# Patient Record
Sex: FEMALE
[De-identification: names, ages, dates, MRNs, and addresses within clinical notes are randomized per-mention and may not be internally consistent; named-entity substitution may affect disease eponyms.]

---

## 2021-05-03 ENCOUNTER — HOSPITAL ENCOUNTER (EMERGENCY)
Dept: HOSPITAL 46 - ED | Age: 26
Discharge: HOME | End: 2021-05-03
Payer: SELF-PAY

## 2021-05-03 VITALS — BODY MASS INDEX: 28.47 KG/M2 | WEIGHT: 145 LBS | HEIGHT: 60 IN

## 2021-05-03 DIAGNOSIS — T50.B95A: ICD-10-CM

## 2021-05-03 DIAGNOSIS — T88.1XXA: Primary | ICD-10-CM

## 2021-05-03 DIAGNOSIS — Z88.2: ICD-10-CM

## 2021-05-03 DIAGNOSIS — R06.02: ICD-10-CM

## 2021-05-04 ENCOUNTER — HOSPITAL ENCOUNTER (EMERGENCY)
Dept: HOSPITAL 46 - ED | Age: 26
Discharge: HOME | End: 2021-05-04
Payer: SELF-PAY

## 2021-05-04 VITALS — HEIGHT: 60 IN | BODY MASS INDEX: 28.47 KG/M2 | WEIGHT: 145 LBS

## 2021-05-04 DIAGNOSIS — T50.B95A: ICD-10-CM

## 2021-05-04 DIAGNOSIS — Z79.899: ICD-10-CM

## 2021-05-04 DIAGNOSIS — T88.1XXA: Primary | ICD-10-CM

## 2021-05-04 DIAGNOSIS — F41.9: ICD-10-CM

## 2021-05-10 ENCOUNTER — HOSPITAL ENCOUNTER (EMERGENCY)
Dept: HOSPITAL 46 - ED | Age: 26
Discharge: HOME | End: 2021-05-10
Payer: SELF-PAY

## 2021-05-10 VITALS — WEIGHT: 145 LBS | HEIGHT: 60 IN | BODY MASS INDEX: 28.47 KG/M2

## 2021-05-10 DIAGNOSIS — Z88.7: ICD-10-CM

## 2021-05-10 DIAGNOSIS — F41.1: Primary | ICD-10-CM

## 2021-05-10 DIAGNOSIS — Z79.899: ICD-10-CM

## 2021-05-10 DIAGNOSIS — Z88.2: ICD-10-CM

## 2021-05-10 NOTE — XMS
PreManage Notification: DEREK SANCHEZ MRN:[id number]
 
Security Information
 
Security Events
No recent Security Events currently on file
 
 
 
CRITERIA MET
------------
- [facility] - 2 Visits in 30 Days
 
 
CARE PROVIDERS
There are no care providers on record at this time.
 
Ramin has no Care Guidelines for this patient.
 
RAJAT VISIT COUNT (12 MO.)
-------------------------------------------------------------------------------------
3  [facility]
-------------------------------------------------------------------------------------
TOTAL 3
-------------------------------------------------------------------------------------
NOTE: Visits indicate total known visits.
 
ED/C VISIT TRACKING (12 MO.)
-------------------------------------------------------------------------------------
05/10/2021 10:02
 [facility]     [place]
 
TYPE: Emergency
 
COMPLAINT:
- CHEST PREASURE, ANXIETY
-------------------------------------------------------------------------------------
05/04/2021 10:21
[facility]
 
TYPE: Emergency
 
COMPLAINT:
- MEDICATION REACTION
 
DIAGNOSES:
- Other long term (current) drug therapy
- Adverse effect of other viral vaccines, initial encounter
- Anxiety disorder, unspecified
- Paresthesia of skin
- Other complications following immunization, not elsewhere classified, initial
encounter
-------------------------------------------------------------------------------------
05/03/2021 16:46
[facility] [place]
 
TYPE: Emergency
 
COMPLAINT:
 
- FACIAL NUMBNESS
 
DIAGNOSES:
- Adverse effect of other viral vaccines, initial encounter
- Other complications following immunization, not elsewhere classified, initial
encounter
- Shortness of breath
- Allergy status to sulfonamides
-------------------------------------------------------------------------------------
 
 
INPATIENT VISIT TRACKING (12 MO.)
No inpatient visits to display in this time frame
 
[email or web address]

## 2021-05-12 NOTE — EKG
[facility]
                                    [address]
                                  [place], [place]  [address]
_________________________________________________________________________________________
                                                                 Signed   
 
 
Normal sinus rhythm
Normal ECG
No previous ECGs available
Confirmed by MARTY IRVING DO ([id number]) on 5/12/2021 1:48:07 PM
 
 
 
 
 
 
 
 
 
 
 
 
 
 
 
 
 
 
 
 
 
 
 
 
 
 
 
 
 
 
 
 
 
 
 
 
 
 
 
 
 
    Electronically Signed By: MARTY IRVING DO  05/12/21 1348
_________________________________________________________________________________________
PATIENT NAME:     DEREK SANCHEZ                        
MEDICAL RECORD #: [id number]                     Electrocardiogram             
          ACCT #: [id number]  
DATE OF BIRTH:   [birth year]                                       
PHYSICIAN:   MARTY IRVING DO                     REPORT #: [id number]
REPORT IS CONFIDENTIAL AND NOT TO BE RELEASED WITHOUT AUTHORIZATION

## 2021-05-13 ENCOUNTER — HOSPITAL ENCOUNTER (EMERGENCY)
Dept: HOSPITAL 46 - ED | Age: 26
Discharge: HOME | End: 2021-05-13
Payer: SELF-PAY

## 2021-05-13 VITALS — WEIGHT: 142.99 LBS | BODY MASS INDEX: 28.07 KG/M2 | HEIGHT: 60 IN

## 2021-05-13 DIAGNOSIS — F41.9: Primary | ICD-10-CM

## 2021-05-13 DIAGNOSIS — Z79.899: ICD-10-CM

## 2021-05-13 DIAGNOSIS — Z88.2: ICD-10-CM

## 2021-05-13 NOTE — XMS
PreManage Notification: DEREK SANCHEZ MRN:[id number]
 
Security Information
 
Security Events
No recent Security Events currently on file
 
 
 
CRITERIA MET
------------
- [facility] - 2 Visits in 30 Days
 
 
CARE PROVIDERS
-------------------------------------------------------------------------------------
UMA FUNES      Physician Assistant     05/11/2021-Current
 
PHONE: [phone]
-------------------------------------------------------------------------------------
 
Ramin has no Care Guidelines for this patient.
Care History
Medical/Surgical
05/11/2021    [facility]
 
      - PATIENT [facility] OFF[facility] 
      STATED.
      - PATIENT HAS A FOLLOW UP APT WITH UMA FUNES- 05/13/21 @ 9:20AM 
      - [facility] CALLED PATIENT-WENT STRAIGHT TO VOICEMAIL.
RAJAT VISIT COUNT (12 MO.)
-------------------------------------------------------------------------------------
4 [facility]
-------------------------------------------------------------------------------------
TOTAL 4
-------------------------------------------------------------------------------------
NOTE: Visits indicate total known visits.
 
ED/UCC VISIT TRACKING (12 MO.)
-------------------------------------------------------------------------------------
05/13/2021 22:16
[facility]
 
TYPE: Emergency
 
COMPLAINT:
- DIFFICULTY BREATHING
-------------------------------------------------------------------------------------
05/10/2021 10:02
[facility] [place]
 
TYPE: Emergency
 
COMPLAINT:
- CHEST PREASURE, ANXIETY
 
DIAGNOSES:
- Allergy status to serum and vaccine
- Other chest pain
 
- Other long term (current) drug therapy
- Generalized anxiety disorder
- Allergy status to sulfonamides
-------------------------------------------------------------------------------------
05/04/2021 10:21
[facility]
 
TYPE: Emergency
 
COMPLAINT:
- MEDICATION REACTION
 
DIAGNOSES:
- Other long term (current) drug therapy
- Adverse effect of other viral vaccines, initial encounter
- Anxiety disorder, unspecified
- Paresthesia of skin
- Other complications following immunization, not elsewhere classified, initial
encounter
-------------------------------------------------------------------------------------
05/03/2021 16:46
[facility] [place]
 
TYPE: Emergency
 
COMPLAINT:
- FACIAL NUMBNESS
 
DIAGNOSES:
- Adverse effect of other viral vaccines, initial encounter
- Other complications following immunization, not elsewhere classified, initial
encounter
- Shortness of breath
- Allergy status to sulfonamides
-------------------------------------------------------------------------------------
 
 
INPATIENT VISIT TRACKING (12 MO.)
No inpatient visits to display in this time frame
 
[email or web address]

## 2021-05-17 ENCOUNTER — HOSPITAL ENCOUNTER (EMERGENCY)
Dept: HOSPITAL 46 - ED | Age: 26
LOS: 1 days | Discharge: HOME | End: 2021-05-18
Payer: SELF-PAY

## 2021-05-17 VITALS — WEIGHT: 147.71 LBS | HEIGHT: 60 IN | BODY MASS INDEX: 29 KG/M2

## 2021-05-17 DIAGNOSIS — R20.0: Primary | ICD-10-CM

## 2021-05-17 DIAGNOSIS — R20.2: ICD-10-CM

## 2021-05-17 DIAGNOSIS — Z88.2: ICD-10-CM

## 2021-05-17 NOTE — XMS
PreManage Notification: DEREK SANCHEZ MRN:[id number]
 
Security Information
 
Security Events
No recent Security Events currently on file
 
 
 
CRITERIA MET
------------
- [facility] - 2 Visits in 30 Days
 
 
CARE PROVIDERS
-------------------------------------------------------------------------------------
UMA FUNES      Physician Assistant     05/11/2021-Current
 
PHONE: [phone]
-------------------------------------------------------------------------------------
 
Ramin has no Care Guidelines for this patient.
Care History
Medical/Surgical
05/11/2021    [facility]
 
      - PATIENT [facility] 
      STATED.
      - PATIENT HAS A FOLLOW UP APT WITH UMA FUNES- 05/13/21 @ 9:20AM 
      - [facility] CALLED PATIENT-WENT STRAIGHT TO VOICEMAIL.
RAAJT VISIT COUNT (12 MO.)
-------------------------------------------------------------------------------------
5 [facility]
-------------------------------------------------------------------------------------
TOTAL 5
-------------------------------------------------------------------------------------
NOTE: Visits indicate total known visits.
 
ED/UCC VISIT TRACKING (12 MO.)
-------------------------------------------------------------------------------------
05/17/2021 22:35
[facility]
 
TYPE: Emergency
 
COMPLAINT:
- LT SIDE FACE AND ARM NUMBNESS
-------------------------------------------------------------------------------------
05/13/2021 22:16
[facility]
 
TYPE: Emergency
 
COMPLAINT:
- DIFFICULTY BREATHING
 
DIAGNOSES:
- Allergy status to sulfonamides
- Other long term (current) drug therapy
 
- Anxiety disorder, unspecified
-------------------------------------------------------------------------------------
05/10/2021 10:02
[facility]
 
TYPE: Emergency
 
COMPLAINT:
- CHEST PREASURE, ANXIETY
 
DIAGNOSES:
- Allergy status to serum and vaccine
- Other chest pain
- Other long term (current) drug therapy
- Generalized anxiety disorder
- Allergy status to sulfonamides
-------------------------------------------------------------------------------------
05/04/2021 10:21
[facility]
 
TYPE: Emergency
 
COMPLAINT:
- MEDICATION REACTION
 
DIAGNOSES:
- Other long term (current) drug therapy
- Adverse effect of other viral vaccines, initial encounter
- Anxiety disorder, unspecified
- Paresthesia of skin
- Other complications following immunization, not elsewhere classified, initial
encounter
-------------------------------------------------------------------------------------
05/03/2021 16:46
[facility]
 
TYPE: Emergency
 
COMPLAINT:
- FACIAL NUMBNESS
 
DIAGNOSES:
- Adverse effect of other viral vaccines, initial encounter
- Other complications following immunization, not elsewhere classified, initial
encounter
- Shortness of breath
- Allergy status to sulfonamides
-------------------------------------------------------------------------------------
 
 
INPATIENT VISIT TRACKING (12 MO.)
No inpatient visits to display in this time frame
 
[email or web address]

## 2022-12-02 ENCOUNTER — HOSPITAL ENCOUNTER (INPATIENT)
Dept: HOSPITAL 46 - FBC | Age: 27
LOS: 8 days | Discharge: HOME | End: 2022-12-10
Attending: OBSTETRICS & GYNECOLOGY | Admitting: OBSTETRICS & GYNECOLOGY
Payer: COMMERCIAL

## 2022-12-02 VITALS — WEIGHT: 147.93 LBS | HEIGHT: 59.02 IN | BODY MASS INDEX: 29.82 KG/M2

## 2022-12-02 DIAGNOSIS — Z20.822: ICD-10-CM

## 2022-12-02 DIAGNOSIS — Z3A.39: ICD-10-CM

## 2022-12-02 DIAGNOSIS — Z67.40: ICD-10-CM

## 2022-12-02 DIAGNOSIS — O34.211: Primary | ICD-10-CM

## 2022-12-02 DIAGNOSIS — Z88.2: ICD-10-CM

## 2022-12-02 DIAGNOSIS — Z88.7: ICD-10-CM

## 2022-12-02 PROCEDURE — A9270 NON-COVERED ITEM OR SERVICE: HCPCS

## 2022-12-02 NOTE — OR
[facility]
                                    [address] [place] [place], [place]  [address]
_________________________________________________________________________________________
                                                                 Draft    
 
 
DATE OF OPERATION:
2022
 
SURGEON:
Tiffany Pagan MD
 
FIRST ASSISTANT:
__________
 
PREOPERATIVE DIAGNOSIS:
Term pregnancy, previous  section x2.
 
POSTOPERATIVE DIAGNOSIS:
Term pregnancy, previous  section x2, delivered.
 
PROCEDURE:
Repeat  section with low segment transverse uterine incision.
 
ANESTHESIA:
Spinal.
 
ESTIMATED BLOOD LOSS:
600 mL.
 
DRAINS:
Delgado catheter.
 
INDICATIONS AND FINDINGS:
The patient is a 27-year-old female, who was admitted at 39 weeks for repeat 
section.  She has undergone two prior C-sections.  At the time of surgery, she was
delivered of a little girl via lower segment transverse uterine incision from the ROT
position with Apgars of 9 and 9 and weight of 6 pounds 11 ounces.  The uterus, tubes,
ovaries, and placenta appeared normal. 
 
DESCRIPTION OF PROCEDURE:
The patient was prepped and draped in the supine position.  A repeat Pfannenstiel skin
incision was made and carried down through the fascia.  The incision was extended
laterally.  The inferior and superior fascial flaps were created.  The muscles were
sharply divided and the peritoneum opened sharply and the incision extended bluntly.
The Gene retractor was then placed.  There were some adhesions of the bladder flap on
the anterior portion of the uterus.  The uterine incision was made at the upper aspect
of the peritoneal reflection.  The incision was extended bluntly superiorly and
 
                                                                                    
_________________________________________________________________________________________
PATIENT NAME:     DEREK SANCHEZ                        
MEDICAL RECORD #: [id number]            OPERATIVE REPORT              
          ACCT #: [id number]  
DATE OF BIRTH:   [birth year]            REPORT #: [id number]      
PHYSICIAN:        TIFFANY PAGAN MD            
PCP:              UMA FUNES PA-C         
REPORT IS CONFIDENTIAL AND NOT TO BE RELEASED WITHOUT AUTHORIZATION
 
 
                                  [facility]
                                    [address] [place], [place]  [address]
_________________________________________________________________________________________
                                                                 Draft    
 
 
inferiorly.  The membranes were artificially ruptured and the baby was delivered with
the above findings and handed off to the pediatric staff in attendance.  The placenta
was removed manually.  The uterus was explored with a lap tape, assuring no remaining
fragments.  The edges of the incision were identified and the uterus closed in 2 layers
using 0 Monocryl.  The first layer was a running locking stitch and the second was a
vertical imbricating stitch.  An additional figure-of-eight was required in the
midportion for control of bleeding.  The abdomen was then irrigated, inspected, and the
incision appeared hemostatic.  There was some bleeding from the adhesion site on the
anterior uterine wall and this was controlled with cautery.  The Gene retractor was
removed, the peritoneum identified.  The peritoneum was closed with a running suture of
3-0 Vicryl.  The muscles were brought together with interrupted sutures of 0 Vicryl.
Bleeding points on the muscles were controlled with cautery.  This area was also
irrigated and good hemostasis was noted.  The fascia was closed from each angle to the
midline with a running suture of 0 Vicryl.  Bleeding points in the subcu were controlled
with cautery and this layer also irrigated with good hemostasis noted.  The deep space
was closed with interrupted sutures of 3-0 Vicryl.  The skin was closed with staples.
All sponge and needle counts were correct.  She was taken to the recovery room in good
condition. 
 
 
 
            ________________________________________
            Tiffany Pagan MD 
 
 
PJW/MODL
Job #:  [id number]
DD:  2022 08:44:20
DT:  2022 09:19:05
 
 
Copies:                                
~
 
 
 
 
 
 
 
 
 
 
                                                                                    
_________________________________________________________________________________________
PATIENT NAME:     DEREK SANCHEZ                        
MEDICAL RECORD #: [id number]            OPERATIVE REPORT              
          ACCT #: [id number]  
DATE OF BIRTH:   [birth year]            REPORT #: [id number]      
PHYSICIAN:        TIFFANY PAGAN MD            
PCP:              UMA FUNES PA-C         
REPORT IS CONFIDENTIAL AND NOT TO BE RELEASED WITHOUT AUTHORIZATION

## 2022-12-08 NOTE — NUR
12/08/22 0856 Cassie Butler
0836 PT ARRIVED TO ROOM 104, PT AWAKE AND TALKING TO RN. PT DENIES
CONCERNS. EDUCATION GIVEN ON FUNDAL CHECK AND SHIVERING AFTER SUGERY.
IV INFUSING WITH LR AND 20 PIT, SITE WNL.

## 2022-12-09 NOTE — PR
[facility]
                                    [address]
                                  [place], [place]  [address]
_________________________________________________________________________________________
                                                                 Signed   
 
 
===================================
PP Progress Notes
===================================
Datetime Report Generated by CPN: 2022 09:31
   
SUBJECTIVE:  [id number]
Pain:  Within Normal Limits
Nausea/Vomiting:  Denies
Flatus:  Yes
Vital Signs:  [id number]
Vital Signs:  Reviewed; Within Normal Limits
POSTPARTUM EXAM:  Ongoing
Cardiovascular:  Normal
Respiratory:  Normal
Abdomen/Uterus:  Abnormal
Lochia:  Normal
Vulva/Perineum:  Not Done
Breasts:  Not Done
CVA Tenderness:  Not Done
Extremities:  Normal
 Incision:  Normal
Breastfeeding Progress:  Normal
Exam Comments:  Abdomen with active BS.  Fundus firm, NT @ U-2.
      
   H/H 9.6/29.3, WBC 15k, plat 217k
IMPRESSION/PLAN/PROCEDURES:  [id number]
Impression:  Normal Postpartum Progression
Other Plans:  ambulate, shower
Procedures:  None
Progress Notes:  Doing well.  Will increase activity.  Probable D/C tomorrow.
Signing Physician:  Tiffany Pagan MD
 
 
Copies:                                
~
 
 
 
 
 
 
 
 
*Electronically Signed*  22  TIFFANY PAGAN MD            
                                                                       
_________________________________________________________________________________________
PATIENT NAME:     DEREK SANCHEZ                       
MEDICAL RECORD #: [id number]                     PROGRESS NOTE                 
          ACCT #: [id number]  
DATE OF BIRTH:   [birth year]                                       
PHYSICIAN:   TIFFANY PAGAN MD                   RPT #: [id number]
REPORT IS CONFIDENTIAL AND NOT TO BE RELEASED WITHOUT AUTHORIZATION

## 2022-12-10 NOTE — PR
[facility]
                                    [address] [place], [place]  [address]
_________________________________________________________________________________________
                                                                 Signed   
 
 
===================================
PP Progress Notes
===================================
Datetime Report Generated by CPN: 12/10/2022 07:47
   
SUBJECTIVE:  [id number]
Pain:  Within Normal Limits
Nausea/Vomiting:  Denies
Flatus:  Yes
Bowel Movement:  No
Vital Signs:  [id number]
Vital Signs:  Reviewed; Within Normal Limits
POSTPARTUM EXAM:  Ongoing
Cardiovascular:  Normal
Respiratory:  Not Done
Abdomen/Uterus:  Normal
Lochia:  Normal
Vulva/Perineum:  Not Done
Breasts:  Not Done
CVA Tenderness:  Normal
Extremities:  Normal
 Incision:  Normal
Breastfeeding Progress:  Normal
Exam Comments:  Abdomen with active BS.  Fundus firm, NT @ U-2.
      
   H/H 9.6/29.3, WBC 15k, plat 217k
IMPRESSION/PLAN/PROCEDURES:  [id number]
Impression:  Normal Postpartum Progression
Plan:  Continue Present Management; Discharge
Other Plans:  ambulate, shower
Procedures:  None
Progress Notes:  S:  No c/o.  Minimal bleeding.  Ambulating well.  Voiding well.  Baby
   latching
   O:  VSS AFebrile
   ABD:  soft, NT, non-distended.  Incision C_D
   Pad:  Scant bleeding
   EXT:  neg edema _ cords.
   A: Stable
   P:  DC today.  F/U for staple removal in three days
   DC meds, Micronor, Percocet.  Will take Fe _ PNV as well as OTC analgesics
   Precautions _ instructions given
Signing Physician:  Tiffany Pagan MD
 
*Electronically Signed*  12/10/22  0747  TIFFANY PAGAN MD            
                                                                       
_________________________________________________________________________________________
PATIENT NAME:     DEREK SANCHEZ                       
MEDICAL RECORD #: [id number]                     PROGRESS NOTE                 
          ACCT #: [id number]  
DATE OF BIRTH:   [birth year]                                       
PHYSICIAN:   TIFFANY PAGAN MD                   RPT #: [id number]
REPORT IS CONFIDENTIAL AND NOT TO BE RELEASED WITHOUT AUTHORIZATION
 
 
                                  [address]  [address]
_________________________________________________________________________________________
                                                                 Signed   
 
 
Copies:                                
~
 
 
 
 
 
 
 
 
 
 
 
 
 
 
 
 
 
 
 
 
 
 
 
 
 
 
 
 
 
 
 
 
 
 
 
 
 
 
 
 
 
*Electronically Signed*  12/10/22  0747  TIFFANY PAGAN MD            
                                                                       
_________________________________________________________________________________________
PATIENT NAME:     DEREK SANCHEZ                       
MEDICAL RECORD #: [id number]                     PROGRESS NOTE                 
          ACCT #: [id number]  
DATE OF BIRTH:   [birth year]                                       
PHYSICIAN:   TIFFANY PAGAN MD                   RPT #: [id number]
REPORT IS CONFIDENTIAL AND NOT TO BE RELEASED WITHOUT AUTHORIZATION

## 2023-01-12 ENCOUNTER — HOSPITAL ENCOUNTER (EMERGENCY)
Dept: HOSPITAL 46 - ED | Age: 28
Discharge: HOME | End: 2023-01-12
Payer: COMMERCIAL

## 2023-01-12 VITALS — WEIGHT: 148.68 LBS | HEIGHT: 60 IN | BODY MASS INDEX: 29.19 KG/M2

## 2023-01-12 DIAGNOSIS — S86.911A: ICD-10-CM

## 2023-01-12 DIAGNOSIS — Z88.2: ICD-10-CM
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
